# Patient Record
Sex: FEMALE | URBAN - METROPOLITAN AREA
[De-identification: names, ages, dates, MRNs, and addresses within clinical notes are randomized per-mention and may not be internally consistent; named-entity substitution may affect disease eponyms.]

---

## 2019-01-23 ENCOUNTER — APPOINTMENT (OUTPATIENT)
Age: 54
Setting detail: DERMATOLOGY
End: 2019-01-25

## 2019-01-23 DIAGNOSIS — L52 ERYTHEMA NODOSUM: ICD-10-CM

## 2019-01-23 DIAGNOSIS — L29.8 OTHER PRURITUS: ICD-10-CM

## 2019-01-23 PROCEDURE — OTHER PRESCRIPTION: OTHER

## 2019-01-23 PROCEDURE — 96372 THER/PROPH/DIAG INJ SC/IM: CPT

## 2019-01-23 PROCEDURE — OTHER INTRAMUSCULAR KENALOG: OTHER

## 2019-01-23 PROCEDURE — 99242 OFF/OP CONSLTJ NEW/EST SF 20: CPT | Mod: 25

## 2019-01-23 PROCEDURE — OTHER ADDITIONAL NOTES: OTHER

## 2019-01-23 RX ORDER — FLUOCINONIDE 0.5 MG/G
OINTMENT TOPICAL
Qty: 1 | Refills: 0 | Status: ERX | COMMUNITY
Start: 2019-01-23

## 2019-01-23 RX ORDER — NAPROXEN 250 MG/1
TABLET ORAL
Qty: 56 | Refills: 3 | Status: ERX | COMMUNITY
Start: 2019-01-23

## 2019-01-23 RX ORDER — HYDROXYZINE HYDROCHLORIDE 25 MG/1
TABLET, FILM COATED ORAL
Qty: 30 | Refills: 1 | Status: ERX | COMMUNITY
Start: 2019-01-23

## 2019-01-23 ASSESSMENT — LOCATION DETAILED DESCRIPTION DERM
LOCATION DETAILED: LEFT DELTOID
LOCATION DETAILED: LEFT PROXIMAL PRETIBIAL REGION
LOCATION DETAILED: RIGHT DISTAL PRETIBIAL REGION

## 2019-01-23 ASSESSMENT — BSA RASH: BSA RASH: 10

## 2019-01-23 ASSESSMENT — LOCATION ZONE DERM
LOCATION ZONE: SHOULDER
LOCATION ZONE: LEG

## 2019-01-23 ASSESSMENT — LOCATION SIMPLE DESCRIPTION DERM
LOCATION SIMPLE: LEFT PRETIBIAL REGION
LOCATION SIMPLE: RIGHT PRETIBIAL REGION
LOCATION SIMPLE: LEFT DELTOID

## 2019-01-23 NOTE — PROCEDURE: INTRAMUSCULAR KENALOG
Add Option For Additional Mediation: No
Concentration (Mg/Ml): 40.0
Detail Level: Simple
Concentration (Mg/Ml) Of Additional Medication: 2.5
Total Volume (Ccs): 1
Kenalog Preparation: [email or web address]
Consent: The risks of atrophy were reviewed with the patient.

## 2019-01-23 NOTE — PROCEDURE: ADDITIONAL NOTES
Detail Level: Simple
Additional Notes: Status post spider bite. Discussed with pt to increase water intake while on naproxen. Verbalized understanding

## 2019-01-30 ENCOUNTER — APPOINTMENT (OUTPATIENT)
Age: 54
Setting detail: DERMATOLOGY
End: 2019-01-31

## 2019-01-30 DIAGNOSIS — L29.8 OTHER PRURITUS: ICD-10-CM

## 2019-01-30 DIAGNOSIS — L52 ERYTHEMA NODOSUM: ICD-10-CM

## 2019-01-30 PROCEDURE — OTHER INTRAMUSCULAR KENALOG: OTHER

## 2019-01-30 PROCEDURE — 99213 OFFICE O/P EST LOW 20 MIN: CPT | Mod: 25

## 2019-01-30 PROCEDURE — 96372 THER/PROPH/DIAG INJ SC/IM: CPT

## 2019-01-30 PROCEDURE — OTHER ADDITIONAL NOTES: OTHER

## 2019-01-30 PROCEDURE — OTHER PRESCRIPTION: OTHER

## 2019-01-30 RX ORDER — FLUOCINONIDE 0.5 MG/G
OINTMENT TOPICAL
Qty: 1 | Refills: 0 | Status: ERX

## 2019-01-30 ASSESSMENT — LOCATION DETAILED DESCRIPTION DERM
LOCATION DETAILED: LEFT PROXIMAL PRETIBIAL REGION
LOCATION DETAILED: LEFT DELTOID
LOCATION DETAILED: RIGHT DISTAL PRETIBIAL REGION

## 2019-01-30 ASSESSMENT — LOCATION SIMPLE DESCRIPTION DERM
LOCATION SIMPLE: RIGHT PRETIBIAL REGION
LOCATION SIMPLE: LEFT PRETIBIAL REGION
LOCATION SIMPLE: LEFT DELTOID

## 2019-01-30 ASSESSMENT — LOCATION ZONE DERM
LOCATION ZONE: SHOULDER
LOCATION ZONE: LEG

## 2019-01-30 NOTE — PROCEDURE: INTRAMUSCULAR KENALOG
Kenalog Preparation: [email or web address]
Detail Level: Simple
Concentration (Mg/Ml) Of Additional Medication: 2.5
Concentration (Mg/Ml): 30.0
Consent: The risks of atrophy were reviewed with the patient.
Add Option For Additional Mediation: No
Treatment Number (Optional): 2
Total Volume (Ccs): 0.7

## 2019-02-27 ENCOUNTER — APPOINTMENT (OUTPATIENT)
Age: 54
Setting detail: DERMATOLOGY
End: 2019-02-28

## 2019-02-27 DIAGNOSIS — L52 ERYTHEMA NODOSUM: ICD-10-CM

## 2019-02-27 PROCEDURE — OTHER PRESCRIPTION: OTHER

## 2019-02-27 PROCEDURE — OTHER ADDITIONAL NOTES: OTHER

## 2019-02-27 PROCEDURE — 99213 OFFICE O/P EST LOW 20 MIN: CPT

## 2019-02-27 RX ORDER — FLUOCINONIDE 0.5 MG/G
OINTMENT TOPICAL
Qty: 1 | Refills: 0 | Status: ERX

## 2019-02-27 NOTE — PROCEDURE: ADDITIONAL NOTES
Additional Notes: Status post spider bite. Discussed with pt to increase water intake while on naproxen. Verbalized understanding. Resolved. Wean off topicals to PRN.  Ok to return to work. Paperwork completed.
Detail Level: Simple

## 2022-06-08 ENCOUNTER — APPOINTMENT (OUTPATIENT)
Age: 57
Setting detail: DERMATOLOGY
End: 2022-06-08

## 2022-06-08 DIAGNOSIS — B07.8 OTHER VIRAL WARTS: ICD-10-CM

## 2022-06-08 DIAGNOSIS — L60.1 ONYCHOLYSIS: ICD-10-CM

## 2022-06-08 PROCEDURE — OTHER MIPS QUALITY: OTHER

## 2022-06-08 PROCEDURE — OTHER COUNSELING: OTHER

## 2022-06-08 PROCEDURE — 99202 OFFICE O/P NEW SF 15 MIN: CPT

## 2022-06-08 PROCEDURE — OTHER PATIENT SPECIFIC COUNSELING: OTHER

## 2022-06-08 ASSESSMENT — LOCATION DETAILED DESCRIPTION DERM: LOCATION DETAILED: RIGHT THUMBNAIL

## 2022-06-08 ASSESSMENT — LOCATION ZONE DERM: LOCATION ZONE: FINGERNAIL

## 2022-06-08 ASSESSMENT — LOCATION SIMPLE DESCRIPTION DERM: LOCATION SIMPLE: RIGHT THUMBNAIL

## 2022-06-08 NOTE — HPI: SKIN LESION
What Type Of Note Output Would You Prefer (Optional)?: Standard Output
How Severe Is Your Skin Lesion?: mild
Is This A New Presentation, Or A Follow-Up?: Skin Lesion
Additional History: Had it shaved off and was a wart per pathology last November

## 2022-06-08 NOTE — PROCEDURE: PATIENT SPECIFIC COUNSELING
Biopsy proven wart per patient in November 2021.  Recommended hand surgeon due to location and nail involvement. PA cell number given to patient if unable to get in for a lengthy amount of time.
Detail Level: Detailed